# Patient Record
(demographics unavailable — no encounter records)

---

## 2024-12-31 NOTE — HISTORY OF PRESENT ILLNESS
[de-identified] : 39-year-old male presenting to [facility] [place] [facility] care and for an annual physical exam.  Patient was being followed in [place] recently moved back to [place].  Patient's spouse is a [employer] physician. PMH: HLD (not on meds; 08/2023 LDL: 159), Vit D deficiency, Last A1c 5.4 PSH: Right inguinal hernia surgery.  FMH: HLD in father, hypotensive in mother.  Allergies: NKDA Meds: None.

## 2024-12-31 NOTE — HEALTH RISK ASSESSMENT
[Good] : ~his/her~ current health as good [Very Good] : ~his/her~  mood as very good [No falls in past year] : Patient reported no falls in the past year [0] : 2) Feeling down, depressed, or hopeless: Not at all (0) [PHQ-2 Negative - No further assessment needed] : PHQ-2 Negative - No further assessment needed [With Family] : lives with family [# of Members in Household ___] :  household currently consist of [unfilled] member(s) [Employed] : employed [Graduate School] : graduate school [# Of Children ___] : has [unfilled] children [] :  [Never] : Never [No] : In the past 12 months have you used drugs other than those required for medical reasons? No [HIV test declined] : HIV test declined [Hepatitis C test declined] : Hepatitis C test declined [None] : None [Fully functional (bathing, dressing, toileting, transferring, walking, feeding)] : Fully functional (bathing, dressing, toileting, transferring, walking, feeding) [Fully functional (using the telephone, shopping, preparing meals, housekeeping, doing laundry, using] : Fully functional and needs no help or supervision to perform IADLs (using the telephone, shopping, preparing meals, housekeeping, doing laundry, using transportation, managing medications and managing finances) [Reports normal functional visual acuity (ie: able to read med bottle)] : Reports normal functional visual acuity [Audit-CScore] : 0 [de-identified] : Walking  [de-identified] : Good diet  [[id number]] : 0 [Reports changes in hearing] : Reports no changes in hearing [Reports changes in vision] : Reports no changes in vision [Reports changes in dental health] : Reports no changes in dental health [de-identified] : 8 and 4 yo  [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

## 2024-12-31 NOTE — ASSESSMENT
[FreeTextEntry1] : 39-year-old male presented for an annual physical exam.  routine blood work today, blood collected in the office. up to date with screening. will call back with results.

## 2025-05-01 NOTE — HEALTH RISK ASSESSMENT
[No] : No [No falls in past year] : Patient reported no falls in the past year [0] : 2) Feeling down, depressed, or hopeless: Not at all (0) [PHQ-2 Negative - No further assessment needed] : PHQ-2 Negative - No further assessment needed [[id number]] : 0 [Never] : Never